# Patient Record
Sex: FEMALE | Race: BLACK OR AFRICAN AMERICAN | NOT HISPANIC OR LATINO | Employment: FULL TIME | URBAN - METROPOLITAN AREA
[De-identification: names, ages, dates, MRNs, and addresses within clinical notes are randomized per-mention and may not be internally consistent; named-entity substitution may affect disease eponyms.]

---

## 2023-03-06 ENCOUNTER — OFFICE VISIT (OUTPATIENT)
Dept: OBSTETRICS AND GYNECOLOGY | Facility: CLINIC | Age: 38
End: 2023-03-06
Payer: COMMERCIAL

## 2023-03-06 VITALS
HEIGHT: 66 IN | SYSTOLIC BLOOD PRESSURE: 137 MMHG | BODY MASS INDEX: 45.48 KG/M2 | WEIGHT: 283 LBS | DIASTOLIC BLOOD PRESSURE: 92 MMHG

## 2023-03-06 DIAGNOSIS — Z12.4 SCREENING FOR MALIGNANT NEOPLASM OF CERVIX: ICD-10-CM

## 2023-03-06 DIAGNOSIS — N92.6 IRREGULAR MENSES: ICD-10-CM

## 2023-03-06 DIAGNOSIS — Z01.411 ENCOUNTER FOR GYNECOLOGICAL EXAMINATION WITH ABNORMAL FINDING: Primary | ICD-10-CM

## 2023-03-06 DIAGNOSIS — E28.2 PCOS (POLYCYSTIC OVARIAN SYNDROME): ICD-10-CM

## 2023-03-06 DIAGNOSIS — Z11.3 SCREEN FOR SEXUALLY TRANSMITTED DISEASES: ICD-10-CM

## 2023-03-06 PROCEDURE — 99385 PREV VISIT NEW AGE 18-39: CPT | Performed by: OBSTETRICS & GYNECOLOGY

## 2023-03-06 PROCEDURE — 99213 OFFICE O/P EST LOW 20 MIN: CPT | Performed by: OBSTETRICS & GYNECOLOGY

## 2023-03-06 RX ORDER — CHLORTHALIDONE 25 MG/1
TABLET ORAL
COMMUNITY
Start: 2022-12-01

## 2023-03-06 RX ORDER — DOCUSATE SODIUM 100 MG/1
CAPSULE, LIQUID FILLED ORAL
COMMUNITY
Start: 2023-02-22

## 2023-03-06 RX ORDER — NORETHINDRONE ACETATE AND ETHINYL ESTRADIOL 1MG-20(21)
1 KIT ORAL DAILY
Qty: 28 TABLET | Refills: 12 | Status: SHIPPED | OUTPATIENT
Start: 2023-03-06 | End: 2023-03-24

## 2023-03-06 RX ORDER — BUDESONIDE AND FORMOTEROL FUMARATE DIHYDRATE 80; 4.5 UG/1; UG/1
AEROSOL RESPIRATORY (INHALATION)
COMMUNITY
Start: 2023-01-05

## 2023-03-06 RX ORDER — SEMAGLUTIDE 0.5 MG/.5ML
INJECTION, SOLUTION SUBCUTANEOUS
COMMUNITY
Start: 2023-02-08

## 2023-03-06 RX ORDER — FERROUS SULFATE 325(65) MG
TABLET ORAL
COMMUNITY
Start: 2023-02-04

## 2023-03-06 RX ORDER — KETOCONAZOLE 20 MG/ML
SHAMPOO TOPICAL
COMMUNITY
Start: 2022-12-18

## 2023-03-06 RX ORDER — LOSARTAN POTASSIUM 25 MG/1
TABLET ORAL
COMMUNITY
Start: 2023-02-07

## 2023-03-06 NOTE — PROGRESS NOTES
Chief Complaint  Gynecologic Exam (Patient is here today est care with annual exam. Last pap was a year ago and UL outpatient- neg results. )  Patient also with PCOS/history of irregular periods    Subjective        Gopal Lam presents to [facility]  History of Present Illness  Patient is here for annual exam.  She denies any history of abnormal Pap smears.  Patient reports a longstanding history of PCOS and irregular menses.  She reports that when she is not taking birth control pills or periods are very sporadic.  She may go 6-12 months without a period and then may have very irregular bleeding randomly.  While on birth control pills, her menses are regular.  She stopped taking her current birth control pills of Junel 1/20 about 2 months ago.  She says that her most recent episode of bleeding started today.    Menstrual History:  OB History        1    Para        Term                AB   1    Living           SAB   1    IAB        Ectopic        Molar        Multiple        Live Births                     Patient's last menstrual period was 2023 (exact date).     Past Medical History:   Diagnosis Date   • Hypertension    • PCOS (polycystic ovarian syndrome)        Past Surgical History:   Procedure Laterality Date   • DILATATION AND CURETTAGE     • WISDOM TOOTH EXTRACTION         Social History     Tobacco Use   • Smoking status: Former     Types: Cigarettes   • Smokeless tobacco: Never   Vaping Use   • Vaping Use: Never used   Substance Use Topics   • Alcohol use: Not Currently   • Drug use: Never       Family History   Problem Relation Age of Onset   • Hypertension Father    • Hypertension Mother    • Breast cancer Neg Hx    • Ovarian cancer Neg Hx    • Uterine cancer Neg Hx    • Colon cancer Neg Hx        Current Outpatient Medications on File Prior to Visit   Medication Sig   • budesonide-formoterol (SYMBICORT) 80-4.5 MCG/ACT inhaler    •  "chlorthalidone (HYGROTON) 25 MG tablet TAKE 1 TABLET BY MOUTH 1 TIME EACH DAY.   • docusate sodium (COLACE) 100 MG capsule    • ferrous sulfate 325 (65 FE) MG tablet    • ketoconazole (NIZORAL) 2 % shampoo APPLY TOPICALLY 1 (ONE) TIME PER WEEK.   • losartan (COZAAR) 25 MG tablet    • metFORMIN (GLUCOPHAGE) 1000 MG tablet    • Wegovy 0.5 MG/0.5ML solution auto-injector      No current facility-administered medications on file prior to visit.       No Known Allergies    ROS:  Constitutional: No fevers, chills, sweats   Eye: No recent visual problems, denies blurry vision   HEENT: No ear pain, nasal congestion, sore throat, voice changes   Respiratory: No shortness of breath, cough, pain on breathing   Cardiovascular: No Chest pain, palpitations   Gastrointestinal: No nausea, vomiting, diarrhea, constipation   Genitourinary: No hematuria, dysuria, lesions on genitalia   Hema/Lymph: Negative for bruising, no edema   Endocrine: Negative for excessive thirst, excessive hunger, heat or cold intolerance   Musculoskeletal: No joint pain, muscle pain, decreased range of motion   Integumentary: No rash, pruritus, abrasions, lesions   Neurologic: No weakness, numbness, headaches   Psychiatric: No anxiety, depression, mood changes         Objective   Vital Signs:  /92   Ht 167.6 cm (66\")   Wt 128 kg (283 lb)   BMI 45.68 kg/m²   Estimated body mass index is 45.68 kg/m² as calculated from the following:    Height as of this encounter: 167.6 cm (66\").    Weight as of this encounter: 128 kg (283 lb).             Physical Exam   Exam performed in the presence of a female chaperone  Patient has provided verbal consent to proceed with exam.    Gen: No acute distress, awake and oriented times three  HENT: Normocephalic, atraumatic, Moist mucous membranes  Eyes: PERRLA, EOMI  Lungs: Normal work of breathing, lungs clear bilaterally  Breast: Symmetrical. No skin changes or nipple retractions. No lumps or masses bilaterally. No " tenderness bilaterally.  Abdomen: soft, nontender, no masses or hernia, no hepatosplenomegaly, non distended, normoactive bowel sounds  Normal external female genitalia, no lesions  Urethra: Normal meatus, no caruncle  Bladder: nontender  Vagina: Mild amount of menstrual appearing blood noted, no discharge, no lesions  Cervix: No cervical motion tenderness, no lesions,  nonfriable  Uterus: Likely retroverted, normal size and shape, nontender  Adnexa: No masses or tenderness  External anal exam: Normal appearance, no lesions or hemorrhoids  Rectal: Deferred  Skin: Warm and dry, no rashes  Psych: Good judgement and insight, normal affect and mood  Neuro: CN 2-12 intact, no gross deficits      Result Review :                   Assessment and Plan   Diagnoses and all orders for this visit:    1. Encounter for gynecological examination with abnormal finding (Primary)    Pap - Ordered today.  STD screening - Cultures performed today.   Contraception - Options discussed with pt at length. Risks, benefits, side effects, and alternatives to various forms of hormonal, nonhormonal and barrier methods discussed. Pt elects continue OCPs.   Safe sex practices encouraged with patient.  Breast cancer screening. Patient encouraged to perform routine self breast exams. Recommend yearly clinical breast exam and mammogram starting at age 40.  Recommend pt take calcium and vitamin D supplementation as well as prenatal vitamin or folic acid if she is attempting to conceive.  Encourage aerobic exercise with at least 30 minutes 5 days/wk.  Avoid excessive alcohol use.  Patient is advised to call the office for results after 1 week if she has not seen or heard the results of any tests ordered or performed today.      2. Screening for malignant neoplasm of cervix  -     IGP,CtNgTv,Apt HPV,rfx 16 / 18,45    3. Screen for sexually transmitted diseases  -     IGP,CtNgTv,Apt HPV,rfx 16 / 18,45    4. PCOS (polycystic ovarian syndrome)  -     US  Non-ob Transvaginal; Future    5. Irregular menses  -     US Non-ob Transvaginal; Future  -     norethindrone-ethinyl estradiol FE (Junel FE 1/20) 1-20 MG-MCG per tablet; Take 1 tablet by mouth Daily.  Dispense: 28 tablet; Refill: 12    Patient reports that she has done very well for a long time on Suyapa.  We discussed the issues with taking combined oral contraceptive pills in patients with chronic hypertension.  Currently her blood pressure is well controlled.  I feel that if she were to stop combined oral contraceptive pills, she would be more likely to have irregular breakthrough bleeding.  We discussed the risk, benefits, alternatives, and she would like to continue with birth control pills at this time.  Believe this is reasonable just need to monitor her blood pressure.  Additionally, we will obtain ultrasound to look for other sources of irregular bleeding such as polyps, endometrial thickening, etc.  Return to the office in about 2 weeks for ultrasound and GYN follow-up.           Follow Up   Return in about 2 weeks (around 3/20/2023) for GYN US and GYN F/U.  Patient was given instructions and counseling regarding her condition or for health maintenance advice. Please see specific information pulled into the AVS if appropriate.

## 2023-03-09 ENCOUNTER — PATIENT ROUNDING (BHMG ONLY) (OUTPATIENT)
Dept: OBSTETRICS AND GYNECOLOGY | Facility: CLINIC | Age: 38
End: 2023-03-09
Payer: COMMERCIAL

## 2023-03-09 ENCOUNTER — PATIENT MESSAGE (OUTPATIENT)
Dept: OBSTETRICS AND GYNECOLOGY | Facility: CLINIC | Age: 38
End: 2023-03-09
Payer: COMMERCIAL

## 2023-03-09 NOTE — PROGRESS NOTES
My chart message has been sent to the patient for PATIENT ROUNDING with [facility].

## 2023-03-12 LAB
C TRACH RRNA CVX QL NAA+PROBE: NEGATIVE
CYTOLOGIST CVX/VAG CYTO: NORMAL
CYTOLOGY CVX/VAG DOC CYTO: NORMAL
CYTOLOGY CVX/VAG DOC THIN PREP: NORMAL
DX ICD CODE: NORMAL
HIV 1 & 2 AB SER-IMP: NORMAL
HPV GENOTYPE REFLEX: NORMAL
HPV I/H RISK 4 DNA CVX QL PROBE+SIG AMP: NEGATIVE
N GONORRHOEA RRNA CVX QL NAA+PROBE: NEGATIVE
OTHER STN SPEC: NORMAL
STAT OF ADQ CVX/VAG CYTO-IMP: NORMAL
T VAGINALIS RRNA SPEC QL NAA+PROBE: NEGATIVE

## 2023-03-24 ENCOUNTER — OFFICE VISIT (OUTPATIENT)
Dept: OBSTETRICS AND GYNECOLOGY | Facility: CLINIC | Age: 38
End: 2023-03-24
Payer: COMMERCIAL

## 2023-03-24 VITALS
HEIGHT: 66 IN | BODY MASS INDEX: 46.03 KG/M2 | DIASTOLIC BLOOD PRESSURE: 82 MMHG | HEART RATE: 79 BPM | SYSTOLIC BLOOD PRESSURE: 137 MMHG | WEIGHT: 286.4 LBS

## 2023-03-24 DIAGNOSIS — N83.201 RIGHT OVARIAN CYST: Primary | ICD-10-CM

## 2023-03-24 DIAGNOSIS — E28.2 PCOS (POLYCYSTIC OVARIAN SYNDROME): ICD-10-CM

## 2023-03-24 DIAGNOSIS — Z30.09 FAMILY PLANNING ADVICE: ICD-10-CM

## 2023-03-24 DIAGNOSIS — N92.6 IRREGULAR MENSES: ICD-10-CM

## 2023-03-24 RX ORDER — AMLODIPINE BESYLATE 10 MG/1
1 TABLET ORAL DAILY
COMMUNITY
Start: 2023-03-13

## 2023-03-24 RX ORDER — LOSARTAN POTASSIUM 25 MG/1
25 TABLET ORAL DAILY
Qty: 30 TABLET | Refills: 11 | COMMUNITY
Start: 2023-01-05 | End: 2024-01-05

## 2023-03-24 RX ORDER — NORETHINDRONE ACETATE AND ETHINYL ESTRADIOL 1; .02 MG/1; MG/1
1 TABLET ORAL DAILY
COMMUNITY
Start: 2022-05-11 | End: 2023-05-11

## 2023-03-24 RX ORDER — KETOCONAZOLE 20 MG/ML
SHAMPOO TOPICAL
COMMUNITY
Start: 2022-12-09 | End: 2023-03-24

## 2023-03-24 NOTE — PROGRESS NOTES
"Chief Complaint  Follow-up (Patient is here today for a 2 wk follow up on PCOS and irregular periods- U/S done today. )  Irregular periods and PCOS    Subjective        Gopal Lam presents to [facility]  History of Present Illness  Patient is here for follow-up of irregular bleeding.  Please see my previous note for description of her bleeding.  She has a long history of PCOS.  Typically this has been well controlled with OCPs.  She did start OCPs at the time of her last visit and has not had any bleeding since.  She is generally doing well at this time.    The following portions of the patient's history were reviewed and updated as appropriate: allergies, current medications, past family history, past medical history, past social history, past surgical history, and problem list.    Objective   Vital Signs:  /82   Pulse 79   Ht 167.6 cm (66\")   Wt 130 kg (286 lb 6.4 oz)   BMI 46.23 kg/m²   Estimated body mass index is 46.23 kg/m² as calculated from the following:    Height as of this encounter: 167.6 cm (66\").    Weight as of this encounter: 130 kg (286 lb 6.4 oz).             Physical Exam   General: No acute distress, awake and oriented x3  Psychiatric: good judgment and insight, normal mood  Neurological: cranial nerves II through XII intact, no deficits    Result Review :    US (3/24/23):  Uterus: 8.31 x 4.93 x 4.44 cm, without obvious intramural or subserosal polyps or other masses  Endometrial linin.71 cm, homogenous appearing without polyps or submucosal fibroids     Left ovary: 3.05 x 1.57 x 1.63 cm, volume 4.087 cm³     Right ovary: 5.01 x 3.82 x 3.60 cm, volume 36.075 cm³  There is a 4.2 x 2.2 cm simple right ovarian cyst noted.  There are no internal septations, irregularities, or excrescences     There is no free fluid     Impression:     4 cm simple right ovarian cyst, otherwise normal pelvic ultrasound     1.  Uterus: Normal size     2.  " Endometrium: Normal non-menopausal thickness     3.  Myometrium: Normal homogenous texture      4.  Ovaries  Left:    Normal/unremarkable   Right:  Simple cyst 4.2 cm                  Assessment and Plan   Diagnoses and all orders for this visit:    1. Right ovarian cyst (Primary)  -     US Non-ob Transvaginal; Future  Patient with a small simple appearing cyst of the right ovary.  This is likely a benign process.  Would recommend repeat ultrasound in 3 months to check for cyst resolution.    2. Irregular menses  Doing well on birth control pills.  Would continue these for now.  Plan to follow-up in 3 months.  If still having breakthrough bleeding despite OCP she should return sooner.    3. PCOS (polycystic ovarian syndrome)    4. Family planning advice    Patient had questions about trying to conceive in the future.  Explained that typically women with PCOS are able to conceive, but may have more difficulty or take a longer amount of time.  We discussed the options of doing medicated cycles when she is ready to try to conceive.  Patient states that she prefers spontaneous conception if possible.  Patient would also need to discontinue losartan prior to pregnancy.  Patient states that she has no immediate plans to conceive right now and prefers to be on birth control pills at this time.  Explained that when she is ready to try to conceive would recommend stopping birth control pills, changing losartan to a different blood pressure medication, and starting a prenatal vitamin.  She verbalized understanding.    I spent 20 minutes today on the care of this patient, including review of the chart and any pertinent prior imaging and labs, interview/exam of the patient, developing care plan, and counseling the patient on management options and excluding any time spent on other separate billable services such as performing procedures or test interpretation, if applicable.         Follow Up   Return in about 3 months (around  6/24/2023) for GYN US in 3 months with GYN FU after US.  Patient was given instructions and counseling regarding her condition or for health maintenance advice. Please see specific information pulled into the AVS if appropriate.

## 2023-06-19 ENCOUNTER — OFFICE VISIT (OUTPATIENT)
Dept: OBSTETRICS AND GYNECOLOGY | Facility: CLINIC | Age: 38
End: 2023-06-19
Payer: COMMERCIAL

## 2023-06-19 VITALS
HEIGHT: 66 IN | WEIGHT: 293 LBS | BODY MASS INDEX: 47.09 KG/M2 | SYSTOLIC BLOOD PRESSURE: 138 MMHG | HEART RATE: 58 BPM | DIASTOLIC BLOOD PRESSURE: 82 MMHG

## 2023-06-19 DIAGNOSIS — N91.2 ABSENT MENSES: ICD-10-CM

## 2023-06-19 DIAGNOSIS — I10 ESSENTIAL HYPERTENSION: ICD-10-CM

## 2023-06-19 DIAGNOSIS — N92.6 IRREGULAR MENSES: ICD-10-CM

## 2023-06-19 DIAGNOSIS — N83.201 RIGHT OVARIAN CYST: Primary | ICD-10-CM

## 2023-06-19 DIAGNOSIS — E28.2 PCOS (POLYCYSTIC OVARIAN SYNDROME): ICD-10-CM

## 2023-06-19 DIAGNOSIS — Z30.09 FAMILY PLANNING: ICD-10-CM

## 2023-06-19 PROCEDURE — 99214 OFFICE O/P EST MOD 30 MIN: CPT | Performed by: OBSTETRICS & GYNECOLOGY

## 2023-06-19 RX ORDER — LABETALOL 200 MG/1
200 TABLET, FILM COATED ORAL 2 TIMES DAILY
Qty: 60 TABLET | Refills: 11 | Status: SHIPPED | OUTPATIENT
Start: 2023-06-19

## 2023-06-19 RX ORDER — SIMETHICONE 125 MG
TABLET,CHEWABLE ORAL
COMMUNITY
Start: 2023-06-11

## 2023-06-19 NOTE — PROGRESS NOTES
"Chief Complaint  Follow-up (Patient is here today for a follow up on a right ovarian cyst- u/s done today. Patient states that she is about 17 days late for her period. Unable to leave a urine sample. )    Vanita Lam presents to [facility]  History of Present Illness  Patient is here for follow-up on ovarian cyst previously noted.  She has stopped taking her birth control pills that were prescribed last visit.  Patient states that she wants to try to conceive.  She says she is currently about 17 days late for her period.  She has not taken a home pregnancy test.  She denies pelvic pain.  She is otherwise in her usual state of care.    The following portions of the patient's history were reviewed and updated as appropriate: allergies, current medications, past family history, past medical history, past social history, past surgical history, and problem list.    Objective   Vital Signs:  /82   Pulse 58   Ht 167.6 cm (66\")   Wt 133 kg (293 lb 3.2 oz)   BMI 47.32 kg/m²   Estimated body mass index is 47.32 kg/m² as calculated from the following:    Height as of this encounter: 167.6 cm (66\").    Weight as of this encounter: 133 kg (293 lb 3.2 oz).             Physical Exam   General: No acute distress, awake and oriented x3  Psychiatric: good judgment and insight, normal mood  Neurological: cranial nerves II through XII intact, no deficits    Result Review :          US today  Findings:  Uterus 8.25 x 4.48 x 3.99, volume 77.215 cm cube  No obvious intramural or subserosal fibroids or masses  Endometrial thickness: 0.5 cm, homogenous in appearance without polyp    Left ovary: 2.84 x 1.77 x 1.81 cm, volume 4.764 cm cube    Right ovary: 5.13 x 2.70 x 3.07 cm, volume 22.265 cm cube  There is a again noted to be a simple right ovarian cyst measuring 4.73 x 2.46 cm today  Overall, this is similar in appearance to the previous ultrasound when the cyst " measured 4.2 x 2.2 cm, and the ovarian volume was 35 cm cube    No free fluid    Impression:    Persistence is noted in the 4.7 cm simple right ovarian cyst, which appears largely unchanged from the previous ultrasound  Otherwise normal pelvic ultrasound         Assessment and Plan   Diagnoses and all orders for this visit:    1. Right ovarian cyst (Primary)   - COREY panel    Patient's ultrasound shows a stable appearance of the previously noted simple right ovarian cyst.  Based on ultrasound characteristics, this appears to be a benign process.  We can check ovarian malignancy panel today.  Otherwise recommend serial follow-up with ultrasounds every 3-6 months.  Patient is generally asymptomatic.    2. PCOS (polycystic ovarian syndrome)  - hcg, quant    Patient's period is 17 days late today.  She has not taken a pregnancy test.  She cannot provide in office urine sample.  We will do a serum pregnancy test.  If positive, patient to return for prenatal counseling.  If negative, then her absent menses is likely related to PCOS.  She has stopped taking her birth control pills because she wishes to try to conceive.    3. Absent menses   - hCG quant    4.  Essential hypertension  -Stop chlorthalidone  -Stop losartan  -Start labetalol 200 mg twice a day    Explained to the patient that she should not be taking losartan if she is trying to conceive.  Explained that ARB's are not recommended in pregnancy or women trying to conceive.  We will start labetalol.  I like to see her back in 3 weeks.  Counseled check blood pressure at that time.    5.  Family-planning advice    Patient desires to try to conceive.  Patient does have a history of PCOS and irregular bleeding when not taking birth control.  She is 17 days late today.  Serum hCG today.  If negative, patient is interested in ovulation induction with medication.  I would recommend letrozole given PCOS status and overweight.      Discussed medication at length today,  including dosing, risks, side effects, etc.  Also discussed increase potential for multiple gestations. Pt verbalizes understanding and wishes to proceed. Instructions for use discussed.  If hCG negative, will start letrozole on day 3 and continue daily for 5 days. Advised pt on home ovulation predictor kits and timed intercourse. Pt to return on day 21 +/- 1 to 2 days for US and serum progesterone to check for ovulation. If patient has ovulation but no conception, would repeat dose/cycle after next menses. If no ovulation, would increase dose of medication +/- add second agent.    .  I spent 30 minutes today on the care of this patient, including review of the chart and any pertinent prior imaging and labs, interview/exam of the patient, developing care plan, and counseling the patient on management options and excluding any time spent on other separate billable services such as performing procedures or test interpretation, if applicable.           Follow Up   No follow-ups on file.  Patient was given instructions and counseling regarding her condition or for health maintenance advice. Please see specific information pulled into the AVS if appropriate.

## 2023-06-20 LAB — HCG INTACT+B SERPL-ACNC: <1 MIU/ML

## 2023-06-21 LAB
CANCER AG125 SERPL-ACNC: 8.9 U/ML (ref 0–38.1)
HE4 SERPL-SCNC: 44.1 PMOL/L (ref 0–61.2)
LABORATORY COMMENT REPORT: NORMAL
POSTMENOPAUSAL INTERP: LOW: NORMAL
PREMENOPAUSAL INTERP: LOW: NORMAL
ROMA SCORE POSTMEN SERPL: 0.72
ROMA SCORE PREMEN SERPL: 0.55

## 2023-07-11 PROBLEM — I10 ESSENTIAL HYPERTENSION: Status: ACTIVE | Noted: 2023-07-11

## 2023-07-28 DIAGNOSIS — I10 ESSENTIAL HYPERTENSION: ICD-10-CM

## 2023-07-28 RX ORDER — HYDROCHLOROTHIAZIDE 12.5 MG/1
12.5 TABLET ORAL DAILY
Qty: 30 TABLET | Refills: 0 | OUTPATIENT
Start: 2023-07-28

## 2023-08-15 ENCOUNTER — TELEPHONE (OUTPATIENT)
Dept: OBSTETRICS AND GYNECOLOGY | Facility: CLINIC | Age: 38
End: 2023-08-15

## 2023-08-15 ENCOUNTER — OFFICE VISIT (OUTPATIENT)
Dept: OBSTETRICS AND GYNECOLOGY | Facility: CLINIC | Age: 38
End: 2023-08-15
Payer: COMMERCIAL

## 2023-08-15 VITALS
HEIGHT: 66 IN | SYSTOLIC BLOOD PRESSURE: 140 MMHG | BODY MASS INDEX: 47.09 KG/M2 | DIASTOLIC BLOOD PRESSURE: 85 MMHG | WEIGHT: 293 LBS

## 2023-08-15 DIAGNOSIS — N92.6 IRREGULAR MENSES: ICD-10-CM

## 2023-08-15 DIAGNOSIS — M79.89 PAIN AND SWELLING OF LEFT LOWER EXTREMITY: ICD-10-CM

## 2023-08-15 DIAGNOSIS — E28.2 PCOS (POLYCYSTIC OVARIAN SYNDROME): Primary | ICD-10-CM

## 2023-08-15 DIAGNOSIS — M79.605 PAIN AND SWELLING OF LEFT LOWER EXTREMITY: ICD-10-CM

## 2023-08-15 NOTE — PROGRESS NOTES
"Chief Complaint  Gynecologic Exam (Patient is here today for a follow up on progesterone levels)    Subjective        Gopal Lam presents to [facility]  History of Present Illness  Patient is here for day 21 progesterone level after starting letrozole for trying to conceive.  She does report some changes to the appearance of her nipples.  She otherwise tolerated the medication well.  Patient is also reporting concerns about swelling of her left leg.  She also reports pain when the leg was more swollen.  Pain is improved today.    Patient's last menstrual period was 07/23/2023 (exact date).    Objective   Vital Signs:  /85   Ht 167.6 cm (66\")   Wt (!) 137 kg (301 lb 9.6 oz)   BMI 48.68 kg/mý   Estimated body mass index is 48.68 kg/mý as calculated from the following:    Height as of this encounter: 167.6 cm (66\").    Weight as of this encounter: 137 kg (301 lb 9.6 oz).             Physical Exam   General: No acute distress, awake and oriented x3  Psychiatric: good judgment and insight, normal mood  Neurological: cranial nerves II through XII intact, no deficits  Extremities: Trace left lower quadrant edema up to the knee, 1+ pitting edema.  No calf tenderness, no cords    Result Review :                   Assessment and Plan   Diagnoses and all orders for this visit:    1. PCOS (polycystic ovarian syndrome) (Primary)    2. Irregular menses  -     Progesterone  -     HCG, B-subunit, Quantitative    Day 21 Progesterone level today.  If progesterone level is consistent with ovulation, advised patient to await next menses.  If no menses, advised to take home pregnancy test.  If positive, she should call to schedule initial OB appointment.  If the patient does ovulate but she does not conceive, we will repeat the same dose of letrozole again on day 3 of the next cycle.    If the patient's progesterone level was not consistent with ovulation, we will increase dose of letrozole for next " cycle.  If the patient did not have a spontaneous next menses, we will send a prescription for progesterone to try to induce a withdrawal bleed, and then would restart letrozole at the newer dose on day 3.  And would have the patient return again around day 21 for a serum progesterone level.     3. Pain and swelling of left lower extremity  -     Duplex Venous Lower Extremity - Left CAR; Future    We will obtain lower extremity Doppler ultrasound to rule out DVT.           Follow Up   No follow-ups on file.  Patient was given instructions and counseling regarding her condition or for health maintenance advice. Please see specific information pulled into the AVS if appropriate.

## 2023-08-16 LAB
HCG INTACT+B SERPL-ACNC: <1 MIU/ML
PROGEST SERPL-MCNC: 3.6 NG/ML

## 2023-08-16 NOTE — TELEPHONE ENCOUNTER
L/m with appointment details for pt.    If she calls back please inform pt she is scheduled at [facility] TODAY at 3:15 pm at for her doppler.  Ask her to please go straight there from work (gets off at 3:00).  She will go in entrance A (main entrance), stop at the information desk and inform them she is there for a doppler, they will direct her where to go.     Also sent mary kate tangg to pt.     Pt # [phone]

## 2023-08-17 ENCOUNTER — TELEPHONE (OUTPATIENT)
Dept: OBSTETRICS AND GYNECOLOGY | Facility: CLINIC | Age: 38
End: 2023-08-17
Payer: COMMERCIAL

## 2023-08-17 NOTE — TELEPHONE ENCOUNTER
----- Message from Rom Cantrell MD sent at 8/16/2023  1:44 PM EDT -----  Please let the patient know that her progesterone level is likely consistent with ovulation.  She should now wait to see if she has her period.  If she does have her period, she should restart the letrozole again once a day for 5 days starting on the third day of her period.    She is also having a lower extremity ultrasound today (8/16).  We will call with those results once we have them.

## 2023-08-25 DIAGNOSIS — N92.6 IRREGULAR MENSES: ICD-10-CM

## 2023-08-25 RX ORDER — MEDROXYPROGESTERONE ACETATE 10 MG/1
TABLET ORAL
Qty: 10 TABLET | Refills: 2 | OUTPATIENT
Start: 2023-08-25

## 2023-09-28 ENCOUNTER — TELEPHONE (OUTPATIENT)
Dept: OBSTETRICS AND GYNECOLOGY | Facility: CLINIC | Age: 38
End: 2023-09-28
Payer: COMMERCIAL

## 2023-09-28 NOTE — TELEPHONE ENCOUNTER
----- Message from Shey Gant RN sent at 9/28/2023 11:04 AM EDT -----  Regarding: Ovulation medication   Contact: [phone]  My Chart. 8/15/23 PCOS, irregular menses. Rx provera & letrozole sent 7/11/23 to [facility]. States the original dose of the medication to make her ovulate did not work. She got her period yesterday. Wants to know if she needs to be seen for a higher dos  e of Rx. Thank you      ----- Message -----  From: Gopal Lam  Sent: 9/28/2023  10:59 AM EDT  To: [facility] Clinical Pool  Subject: Ovulation medication                             The original does of the medication to make me ovulate didn't work I got my period yesterday do i have to come in for a visit to get a higher dose of the medication

## 2023-10-23 ENCOUNTER — OFFICE VISIT (OUTPATIENT)
Dept: OBSTETRICS AND GYNECOLOGY | Facility: CLINIC | Age: 38
End: 2023-10-23
Payer: COMMERCIAL

## 2023-10-23 VITALS
BODY MASS INDEX: 47.09 KG/M2 | DIASTOLIC BLOOD PRESSURE: 90 MMHG | WEIGHT: 293 LBS | SYSTOLIC BLOOD PRESSURE: 140 MMHG | HEIGHT: 66 IN

## 2023-10-23 DIAGNOSIS — N97.9 FEMALE INFERTILITY: ICD-10-CM

## 2023-10-23 DIAGNOSIS — E66.01 MORBID OBESITY WITH BMI OF 45.0-49.9, ADULT: ICD-10-CM

## 2023-10-23 DIAGNOSIS — Z30.09 FAMILY PLANNING ADVICE: Primary | ICD-10-CM

## 2023-10-23 PROCEDURE — 99213 OFFICE O/P EST LOW 20 MIN: CPT | Performed by: OBSTETRICS & GYNECOLOGY

## 2023-10-23 RX ORDER — LETROZOLE 2.5 MG/1
TABLET, FILM COATED ORAL DAILY
COMMUNITY

## 2023-10-23 NOTE — PROGRESS NOTES
"Chief Complaint  Gynecologic Exam (Patient is here today for an ovulation check. )    Subjective        Gopal Lam presents to [facility]  History of Present Illness  Patient is here for follow-up of infertility, felt to be related to anovulation.  She is accompanied by her partner.  LMP was 9/27/2023.  She has been having regular periods since starting the letrozole.  She is actually day 26 today, so she is somewhat late for day 21 progesterone level.    Her partner is here with her today.  He has had 4 children, the youngest of which is 1-year-old.    Patient's last menstrual period was 09/27/2023 (exact date).    The following portions of the patient's history were reviewed and updated as appropriate: allergies, current medications, past family history, past medical history, past social history, past surgical history, and problem list.      Objective   Vital Signs:  /90   Ht 167.6 cm (66\")   Wt (!) 140 kg (309 lb)   BMI 49.87 kg/m²   Estimated body mass index is 49.87 kg/m² as calculated from the following:    Height as of this encounter: 167.6 cm (66\").    Weight as of this encounter: 140 kg (309 lb).             Physical Exam   General: No acute distress, awake and oriented x3  Psychiatric: good judgment and insight, normal mood  Neurological: cranial nerves II through XII intact, no deficits    Result Review :                   Assessment and Plan   Diagnoses and all orders for this visit:    1. Family planning advice (Primary)  -     HCG, B-subunit, Quantitative    2. Female infertility    Patient is actually too late to do a day 21 progesterone level.  She is day 26 today.  We will just check hCG level.  If negative, patient instructed to await menses.  If no menses, she can contact us and we will consider increased dose of letrozole.  If she has a menses, would restart letrozole on day 3 at the same dose and return on day 21.    Patient becoming somewhat frustrated that " she has not been able to conceive yet.  We discussed all potential possibilities for infertility.  We also discussed male factor and tubal factor.  I would still advise for the patient to continue trying with letrozole for another 2-3 months.  If unsuccessful at that time would obtain HSG.  Partner says he is also willing to do a semen analysis.  Partner has had 4 previous children, including a 1-year-old, so likely he has adequate sperm count/function, but could consider this as well if not having success.    We have also discussed the possibility of unexplained infertility.  Explained that if the entire work-up including HSG and semen analysis is normal and the patient appears to be ovulatory, 1 possibility would be IUI.  Explained the process of IUI.  That would need to be done through an [facility] practice.  They are also welcome to continue trying with letrozole.    3. Morbid obesity with BMI of 45.0-49.9, adult    We discussed the role of obesity in the process of trying to conceive as well as an pregnancy.  Obesity has been shown to have increased risk of pregnancy loss, birth defects, and several complications of pregnancy including  labor, diabetes, hypertension,  section, deep venous thrombosis, etc.  Discussed the importance of diet, exercise, and weight loss.  Discussed goal of at least 5 days of cardio exercise for at least 30 minutes a day.  We discussed diet such as weight watchers.    I spent 20 minutes today on the care of this patient, including review of the chart and any pertinent prior imaging and labs, interview/exam of the patient, developing care plan, and counseling the patient on management options and excluding any time spent on other separate billable services such as performing procedures or test interpretation, if applicable.           Follow Up   Return pending hcg results.  Patient was given instructions and counseling regarding her condition or for health maintenance advice.  Please see specific information pulled into the AVS if appropriate.

## 2023-10-24 LAB — HCG INTACT+B SERPL-ACNC: <1 MIU/ML

## 2023-12-09 DIAGNOSIS — I10 ESSENTIAL HYPERTENSION: ICD-10-CM

## 2023-12-11 RX ORDER — HYDROCHLOROTHIAZIDE 12.5 MG/1
12.5 TABLET ORAL DAILY
Qty: 30 TABLET | Refills: 0 | OUTPATIENT
Start: 2023-12-11

## 2024-04-10 ENCOUNTER — OFFICE VISIT (OUTPATIENT)
Dept: OBSTETRICS AND GYNECOLOGY | Facility: CLINIC | Age: 39
End: 2024-04-10
Payer: COMMERCIAL

## 2024-04-10 VITALS
DIASTOLIC BLOOD PRESSURE: 72 MMHG | BODY MASS INDEX: 47.09 KG/M2 | WEIGHT: 293 LBS | SYSTOLIC BLOOD PRESSURE: 122 MMHG | HEIGHT: 66 IN

## 2024-04-10 DIAGNOSIS — R10.32 COLICKY LLQ ABDOMINAL PAIN: Primary | ICD-10-CM

## 2024-04-10 DIAGNOSIS — K59.09 CHRONIC CONSTIPATION: ICD-10-CM

## 2024-04-10 DIAGNOSIS — N97.9 FEMALE INFERTILITY: ICD-10-CM

## 2024-04-10 DIAGNOSIS — N89.8 VAGINAL DISCHARGE: ICD-10-CM

## 2024-04-10 RX ORDER — DOXYCYCLINE HYCLATE 100 MG/1
100 CAPSULE ORAL 2 TIMES DAILY
Qty: 10 CAPSULE | Refills: 0 | Status: SHIPPED | OUTPATIENT
Start: 2024-04-10 | End: 2024-04-15

## 2024-04-10 RX ORDER — AMOXICILLIN 250 MG
1 CAPSULE ORAL DAILY
COMMUNITY
Start: 2024-03-22

## 2024-04-10 RX ORDER — MULTIVIT,IRON,MINERALS/LUTEIN
1 TABLET ORAL DAILY
COMMUNITY
Start: 2024-02-05 | End: 2025-02-04

## 2024-04-10 RX ORDER — NAPROXEN 500 MG/1
500 TABLET ORAL 2 TIMES DAILY WITH MEALS
COMMUNITY
Start: 2024-02-07

## 2024-04-10 RX ORDER — METHOCARBAMOL 500 MG/1
500 TABLET, FILM COATED ORAL
COMMUNITY
Start: 2024-02-05

## 2024-04-10 RX ORDER — POLYETHYLENE GLYCOL 3350 17 G/17G
8.5 POWDER, FOR SOLUTION ORAL
COMMUNITY
Start: 2024-03-22

## 2024-04-10 RX ORDER — POTASSIUM CHLORIDE 20 MEQ/1
40 TABLET, EXTENDED RELEASE ORAL DAILY
COMMUNITY
Start: 2024-03-30 | End: 2025-03-30

## 2024-04-10 RX ORDER — METRONIDAZOLE 500 MG/1
TABLET ORAL
COMMUNITY
Start: 2024-03-29 | End: 2024-04-10

## 2024-04-10 NOTE — PROGRESS NOTES
Chief Complaint  Gynecologic Exam (Patient is here for pelvic pain (been present about 3 weeks))    Vanita Lam presents to [facility]  History of Present Illness  Patient is here for evaluation of left lower quadrant pain.  She says it has been ongoing for about the last 3 weeks.  She saw her PCP about 2 weeks ago who told her that she should have an ultrasound done at that the PCP thought she felt a right ovarian mass at the time of her exam.  Patient reports the pain has been present consistently on the left side.  She states that it is intermittent and occurs about 2-3 times per week.  She reports the pain might last about 1 or 2 hours at a time.  She reports it is colicky and crampy in nature.  She denies any obvious aggravating factors.  She has not really tried taking any medication for the pain when it occurs as it has not been that severe.  Patient does report longstanding issues with constipation.  She says she has been taking medicine for this recently and and this has been better.  Previously she only had bowel movements about every 4 days.  Now she mostly has a bowel movement every day.  Patient has been trying to conceive now for about the last 10 months.  She reports menstrual cycles that are for the most part occurring monthly, but her cycle length varies.  She says typically it is closer to 35 days.  She has tried a few rounds of letrozole, but has not been consistent in taking each month.  She has not taken letrozole recently.  Patient requests referral for HSG to evaluate for tubal factor as a cause of her infertility.  She is also interested in resuming letrozole if possible.    The following portions of the patient's history were reviewed and updated as appropriate: allergies, current medications, past family history, past medical history, past social history, past surgical history, and problem list.    Objective   Vital Signs:  /72   Ht 167.6 cm  "(65.98\")   Wt (!) 145 kg (319 lb)   BMI 51.51 kg/m²   Estimated body mass index is 51.51 kg/m² as calculated from the following:    Height as of this encounter: 167.6 cm (65.98\").    Weight as of this encounter: 145 kg (319 lb).             Physical Exam   Gen: No acute distress, awake and oriented times three  Abdomen: soft, nontender, no masses or hernia, non distended, normoactive bowel sounds  Pelvic: Exam performed in the presence of a female chaperone  Patient has provided verbal consent to proceed with exam.  Normal external female genitalia, no lesions  Urethra: Normal meatus, no caruncle  Bladder: nontender  Vagina: No bloo; there is mild amount of white vaginal discharge noted  Cervix: No cervical motion tenderness, no lesions, no active bleeding, nonfriable  Uterus: Anteverted, normal size and shape, nontender  Adnexa: No masses or tenderness  External anal exam: Normal appearance, no lesions or hemorrhoids  Rectal: Deferred  Psych: Good judgement and insight, normal affect and mood    Result Review :                     Assessment and Plan     Diagnoses and all orders for this visit:    1. Colicky LLQ abdominal pain (Primary)  -     US Non-ob Transvaginal; Future  -     NuSwab VG+ - Swab, Vagina    No obvious mass noted on exam today.  Patient does have a history of constipation, and given the fact that her pain has been more consistently on the left side, this could be more of a GI issue than gynecologic.  We will obtain ultrasound to try to evaluate the ovaries.  She has had an ovarian cyst in the past which had previously been small.  Will also obtain cultures today to exclude infectious source.    2. Chronic constipation  Continue bowel regimen to help prevent constipation.    3. Female infertility  -     FL Hysterosalpingogram; Future  -     doxycycline (VIBRAMYCIN) 100 MG capsule; Take 1 capsule by mouth 2 (Two) Times a Day for 5 days.  Dispense: 10 capsule; Refill: 0    Patient like to pursue " HSG.  Instructions given to the patient.  Referral placed.  Advised regarding the use of doxycycline for 5 days around the time of the procedure to prevent a sending infection.  Instructions given to the patient.    4. Vaginal discharge  -     NuSwab VG+ - Swab, Vagina    We will have the patient do a telehealth appointment after her ultrasound to discuss the results.  I also explained that the patient should have another telehealth appointment after her HSG to discuss the results of that procedure.  If HSG is normal, can discuss resuming use of letrozole.    Patient blood pressures have been better controlled on her current antihypertensive regimen.  She should continue this for now.  She should also be taking daily prenatal vitamin.         Follow Up     Return GYn US next avail; telehealth after US.  Patient was given instructions and counseling regarding her condition or for health maintenance advice. Please see specific information pulled into the AVS if appropriate.

## 2024-04-12 LAB
A VAGINAE DNA VAG QL NAA+PROBE: NORMAL SCORE
BVAB2 DNA VAG QL NAA+PROBE: NORMAL SCORE
C ALBICANS DNA VAG QL NAA+PROBE: NEGATIVE
C GLABRATA DNA VAG QL NAA+PROBE: NEGATIVE
C TRACH DNA VAG QL NAA+PROBE: NEGATIVE
MEGA1 DNA VAG QL NAA+PROBE: NORMAL SCORE
N GONORRHOEA DNA VAG QL NAA+PROBE: NEGATIVE
T VAGINALIS DNA VAG QL NAA+PROBE: NEGATIVE

## 2025-06-08 NOTE — PROGRESS NOTES
"GYN VISIT    Chief Complaint   Patient presents with    Follow-up     Here today with pain during intercourse        SUBJECTIVE    Gopal is a 39 y.o.  who presents with pain during intercourse.. This started sometime ago.  She denies any new partners, trauma to the area, or vaginal discharge outside of her normal discharge.  She reports that the pain is greater on the left and made worse by penetration.    LMP: Patient's last menstrual period was 2025.    Past Medical History:   Diagnosis Date    Hypertension     PCOS (polycystic ovarian syndrome)         Past Surgical History:   Procedure Laterality Date    DILATATION AND CURETTAGE      WISDOM TOOTH EXTRACTION          Review of Systems   Constitutional:  Negative for chills, diaphoresis, fatigue and fever.   Genitourinary:  Positive for dyspareunia. Negative for decreased urine volume, difficulty urinating, flank pain, frequency, genital sores, hematuria, menstrual problem, urgency, urinary incontinence, vaginal bleeding, vaginal discharge and vaginal pain.   Hematological:  Negative for adenopathy.   All other systems reviewed and are negative.      OBJECTIVE     Vitals:    25 1407   BP: 133/91   Weight: 136 kg (300 lb)   Height: 167.6 cm (65.98\")        Physical Exam  Constitutional:       General: She is awake.      Appearance: Normal appearance. She is well-developed and well-groomed.   HENT:      Head: Normocephalic and atraumatic.   Pulmonary:      Effort: Pulmonary effort is normal.   Musculoskeletal:      Cervical back: Normal range of motion.   Neurological:      General: No focal deficit present.      Mental Status: She is alert and oriented to person, place, and time.   Skin:     General: Skin is warm and dry.   Psychiatric:         Mood and Affect: Mood normal.         Behavior: Behavior normal. Behavior is cooperative.   Vitals reviewed.         ASSESSMENT/PLAN    Diagnoses and all orders for this visit:    1. Dyspareunia, female " (Primary)  -     POC Urinalysis Dipstick  -     Urine Culture - Urine, Urine, Clean Catch  -     NuSwab VG+ - Swab, Vagina  -     Genital Mycoplasmas JEANE, Swab - Swab, Cervix  -     US Non-ob Transvaginal; Future    2. Pelvic pain  -     POC Urinalysis Dipstick  -     Urine Culture - Urine, Urine, Clean Catch  -     NuSwab VG+ - Swab, Vagina  -     Genital Mycoplasmas JEANE, Swab - Swab, Cervix  -     US Non-ob Transvaginal; Future    Vaginal swab sent for culture. Will notify patient of results when available and treat if indicated.   Urine sent for culture. Will notify patient of results and treat if indicated.  She will return to care within 2 weeks for an ultrasound and office visit to follow.     Return in about 2 weeks (around 6/27/2025), or if symptoms worsen or fail to improve, for ultrasound and office visit.    I spent 30 minutes caring for Gopal on this date of service. This time includes time spent by me in the following activities: preparing for the visit, reviewing tests, performing a medically appropriate examination and/or evaluation, counseling and educating the patient/family/caregiver, referring and communicating with other health care professionals, documenting information in the medical record, independently interpreting results and communicating that information with the patient/family/caregiver, care coordination, ordering medications, ordering test(s), ordering procedure(s), obtaining a separately obtained history, and reviewing a separately obtained history.    Kassidy You CNM  6/13/2025  14:36 EDT

## 2025-06-13 ENCOUNTER — OFFICE VISIT (OUTPATIENT)
Dept: OBSTETRICS AND GYNECOLOGY | Facility: CLINIC | Age: 40
End: 2025-06-13
Payer: COMMERCIAL

## 2025-06-13 VITALS
SYSTOLIC BLOOD PRESSURE: 133 MMHG | HEIGHT: 66 IN | DIASTOLIC BLOOD PRESSURE: 91 MMHG | WEIGHT: 293 LBS | BODY MASS INDEX: 47.09 KG/M2

## 2025-06-13 DIAGNOSIS — N94.10 DYSPAREUNIA, FEMALE: Primary | ICD-10-CM

## 2025-06-13 DIAGNOSIS — R10.2 PELVIC PAIN: ICD-10-CM

## 2025-06-13 LAB
BILIRUB BLD-MCNC: NEGATIVE MG/DL
GLUCOSE UR STRIP-MCNC: NEGATIVE MG/DL
KETONES UR QL: NEGATIVE
LEUKOCYTE EST, POC: NEGATIVE
NITRITE UR-MCNC: NEGATIVE MG/ML
PROT UR STRIP-MCNC: NEGATIVE MG/DL
RBC # UR STRIP: NEGATIVE /UL

## 2025-06-15 LAB
BACTERIA UR CULT: NORMAL
BACTERIA UR CULT: NORMAL

## 2025-06-16 ENCOUNTER — RESULTS FOLLOW-UP (OUTPATIENT)
Dept: OBSTETRICS AND GYNECOLOGY | Facility: CLINIC | Age: 40
End: 2025-06-16
Payer: COMMERCIAL

## 2025-06-16 LAB
A VAGINAE DNA VAG QL NAA+PROBE: ABNORMAL SCORE
BVAB2 DNA VAG QL NAA+PROBE: ABNORMAL SCORE
C ALBICANS DNA VAG QL NAA+PROBE: NEGATIVE
C GLABRATA DNA VAG QL NAA+PROBE: NEGATIVE
C TRACH DNA SPEC QL NAA+PROBE: NEGATIVE
MEGA1 DNA VAG QL NAA+PROBE: ABNORMAL SCORE
N GONORRHOEA DNA VAG QL NAA+PROBE: NEGATIVE
T VAGINALIS DNA VAG QL NAA+PROBE: NEGATIVE

## 2025-06-16 RX ORDER — METRONIDAZOLE 500 MG/1
500 TABLET ORAL 2 TIMES DAILY
Qty: 14 TABLET | Refills: 0 | Status: SHIPPED | OUTPATIENT
Start: 2025-06-16 | End: 2025-06-23

## 2025-06-18 LAB
M GENITALIUM DNA SPEC QL NAA+PROBE: NEGATIVE
M HOMINIS DNA SPEC QL NAA+PROBE: NEGATIVE
UREAPLASMA DNA SPEC QL NAA+PROBE: POSITIVE

## 2025-06-18 RX ORDER — DOXYCYCLINE 100 MG/1
100 TABLET ORAL 2 TIMES DAILY
Qty: 28 TABLET | Refills: 0 | Status: SHIPPED | OUTPATIENT
Start: 2025-06-18 | End: 2025-07-02

## 2025-06-22 NOTE — PROGRESS NOTES
"GYN VISIT    Chief Complaint   Patient presents with    Follow-up     Here today for pelvic pain.        SHAAN Herron is a 39 y.o.  who presents with pelvic pain.  This has been ongoing for some time.  She did have an ultrasound performed prior to our visit together today.     LMP: Patient's last menstrual period was 2025.    Past Medical History:   Diagnosis Date    Hypertension     PCOS (polycystic ovarian syndrome)         Past Surgical History:   Procedure Laterality Date    DILATATION AND CURETTAGE      WISDOM TOOTH EXTRACTION          Review of Systems   Constitutional:  Negative for chills, diaphoresis, fatigue and fever.   Genitourinary:  Positive for pelvic pain. Negative for decreased urine volume, difficulty urinating, dyspareunia, flank pain, frequency, genital sores, hematuria, menstrual problem, urgency, urinary incontinence, vaginal bleeding, vaginal discharge and vaginal pain.   Hematological:  Negative for adenopathy.   All other systems reviewed and are negative.      OBJECTIVE     Vitals:    25 1423   BP: 149/91   Weight: 136 kg (300 lb)   Height: 167.6 cm (65.98\")        Physical Exam  Constitutional:       General: She is awake.      Appearance: Normal appearance. She is well-developed and well-groomed.   HENT:      Head: Normocephalic and atraumatic.   Pulmonary:      Effort: Pulmonary effort is normal.   Musculoskeletal:      Cervical back: Normal range of motion.   Neurological:      General: No focal deficit present.      Mental Status: She is alert and oriented to person, place, and time.   Skin:     General: Skin is warm and dry.   Psychiatric:         Mood and Affect: Mood normal.         Behavior: Behavior normal. Behavior is cooperative.   Vitals reviewed.         ASSESSMENT/PLAN    Diagnoses and all orders for this visit:    1. Pelvic pain (Primary)    2. Dyspareunia, female    Reviewed and discussed TVUS result-see preliminary read below.  Recommended " follow-up in 6 weeks to reevaluate the left complex ovarian cyst that was seen today.  Recommended OTC analgesics such as ibuprofen and acetaminophen for pain.    Return in about 6 weeks (around 8/6/2025) for ultrasound and office visit.    I spent 30 minutes caring for Gopal on this date of service. This time includes time spent by me in the following activities: preparing for the visit, reviewing tests, performing a medically appropriate examination and/or evaluation, counseling and educating the patient/family/caregiver, referring and communicating with other health care professionals, documenting information in the medical record, independently interpreting results and communicating that information with the patient/family/caregiver, care coordination, ordering medications, ordering test(s), ordering procedure(s), obtaining a separately obtained history, and reviewing a separately obtained history.      Please note: Clinical decisions and patient counseling in this encounter were based on real-time bedside ultrasound findings and the clinical presentation at the time of evaluation. Final medical decision-making may be subject to change pending formal radiology interpretation of the ultrasound images. The patient will be contacted if any additional follow-up or changes to the care plan are indicated once the official report is reviewed.      Kassidy You CNM  6/30/2025  15:12 EDT      PRELIMINARY ULTRASOUND RESULT  Study: Transvaginal pelvic ultrasound  Findings:  Uterus measures 7.72 x 5.42 x 3.85 cm, volume 84.348 cm3, anteverted  There are no intramural or subserosal fibroids or masses identified and the endometrium appears arcuate.  Endometrial thickness measures 0.75 cm, and is homogenous appearance without evidence of polyps.  Cervix is normal-appearing.  Left ovary: 5.95 x 4.42 x 4.25 cm, volume 58.523 cm3  There is a left complex ovarian cyst measuring3.79 x 4.29 x 4.04 cm, volume 32.350 cm3,    Normal Doppler flow seen to the left ovary.  Right ovary: 2.88 x 2.15 x 1.76 cm, volume 5.706 cm3  There is no adnexal mass or cyst identified.  There is no free fluid.  Comparative data: available for examination  Kassidy You CNM  6/25/2025  15:05 EDT

## 2025-06-25 ENCOUNTER — OFFICE VISIT (OUTPATIENT)
Dept: OBSTETRICS AND GYNECOLOGY | Facility: CLINIC | Age: 40
End: 2025-06-25
Payer: COMMERCIAL

## 2025-06-25 VITALS
SYSTOLIC BLOOD PRESSURE: 149 MMHG | WEIGHT: 293 LBS | BODY MASS INDEX: 47.09 KG/M2 | HEIGHT: 66 IN | DIASTOLIC BLOOD PRESSURE: 91 MMHG

## 2025-06-25 DIAGNOSIS — R10.2 PELVIC PAIN: Primary | ICD-10-CM

## 2025-06-25 DIAGNOSIS — N94.10 DYSPAREUNIA, FEMALE: ICD-10-CM

## 2025-08-06 ENCOUNTER — OFFICE VISIT (OUTPATIENT)
Dept: OBSTETRICS AND GYNECOLOGY | Facility: CLINIC | Age: 40
End: 2025-08-06
Payer: COMMERCIAL

## 2025-08-06 VITALS
SYSTOLIC BLOOD PRESSURE: 136 MMHG | DIASTOLIC BLOOD PRESSURE: 85 MMHG | BODY MASS INDEX: 47.09 KG/M2 | WEIGHT: 293 LBS | HEIGHT: 66 IN

## 2025-08-06 DIAGNOSIS — R10.2 PELVIC PAIN: Primary | ICD-10-CM

## 2025-08-06 DIAGNOSIS — N83.299 COMPLEX OVARIAN CYST: ICD-10-CM

## 2025-08-13 ENCOUNTER — OFFICE VISIT (OUTPATIENT)
Dept: OBSTETRICS AND GYNECOLOGY | Facility: CLINIC | Age: 40
End: 2025-08-13
Payer: COMMERCIAL

## 2025-08-13 VITALS
BODY MASS INDEX: 46.09 KG/M2 | WEIGHT: 286.8 LBS | HEIGHT: 66 IN | SYSTOLIC BLOOD PRESSURE: 142 MMHG | DIASTOLIC BLOOD PRESSURE: 98 MMHG

## 2025-08-13 DIAGNOSIS — N92.6 IRREGULAR MENSES: ICD-10-CM

## 2025-08-13 DIAGNOSIS — N97.9 FEMALE INFERTILITY: Primary | ICD-10-CM

## 2025-08-13 PROCEDURE — 99213 OFFICE O/P EST LOW 20 MIN: CPT | Performed by: OBSTETRICS & GYNECOLOGY

## 2025-08-13 RX ORDER — SENNOSIDES 8.6 MG/1
8.6 TABLET ORAL DAILY
COMMUNITY
Start: 2025-06-12 | End: 2026-06-12

## 2025-08-13 RX ORDER — METFORMIN HYDROCHLORIDE 500 MG/1
500 TABLET, EXTENDED RELEASE ORAL DAILY
COMMUNITY
Start: 2025-06-12

## 2025-08-13 RX ORDER — BUDESONIDE AND FORMOTEROL FUMARATE DIHYDRATE 80; 4.5 UG/1; UG/1
2 AEROSOL RESPIRATORY (INHALATION)
COMMUNITY
Start: 2025-04-11

## 2025-08-13 RX ORDER — DOXYCYCLINE 100 MG/1
100 CAPSULE ORAL 2 TIMES DAILY
Qty: 10 CAPSULE | Refills: 0 | Status: SHIPPED | OUTPATIENT
Start: 2025-08-13 | End: 2025-08-18

## 2025-08-13 RX ORDER — HYDROCHLOROTHIAZIDE 12.5 MG/1
12.5 TABLET ORAL DAILY
COMMUNITY
Start: 2025-02-17

## 2025-08-13 RX ORDER — SEMAGLUTIDE 1 MG/.5ML
INJECTION, SOLUTION SUBCUTANEOUS
COMMUNITY
Start: 2025-05-07